# Patient Record
Sex: MALE | Race: WHITE | URBAN - METROPOLITAN AREA
[De-identification: names, ages, dates, MRNs, and addresses within clinical notes are randomized per-mention and may not be internally consistent; named-entity substitution may affect disease eponyms.]

---

## 2024-11-22 ENCOUNTER — OFFICE VISIT (OUTPATIENT)
Age: 79
End: 2024-11-22

## 2024-11-22 VITALS
OXYGEN SATURATION: 98 % | TEMPERATURE: 97.3 F | HEART RATE: 77 BPM | SYSTOLIC BLOOD PRESSURE: 118 MMHG | DIASTOLIC BLOOD PRESSURE: 74 MMHG

## 2024-11-22 DIAGNOSIS — H61.23 BILATERAL IMPACTED CERUMEN: Primary | ICD-10-CM

## 2024-11-22 RX ORDER — APIXABAN 5 MG/1
5 TABLET, FILM COATED ORAL 2 TIMES DAILY
COMMUNITY

## 2024-11-22 RX ORDER — METOPROLOL SUCCINATE 25 MG/1
25 TABLET, EXTENDED RELEASE ORAL DAILY
COMMUNITY

## 2024-11-22 RX ORDER — GLIMEPIRIDE 4 MG/1
4 TABLET ORAL
COMMUNITY

## 2024-11-22 RX ORDER — FLUOROURACIL 50 MG/G
CREAM TOPICAL
COMMUNITY
Start: 2024-10-29

## 2024-11-22 RX ORDER — IRBESARTAN 300 MG/1
300 TABLET ORAL NIGHTLY
COMMUNITY
Start: 2024-11-19

## 2024-11-22 RX ORDER — HYDROCHLOROTHIAZIDE 25 MG/1
25 TABLET ORAL DAILY
COMMUNITY

## 2024-11-22 RX ORDER — ROSUVASTATIN CALCIUM 20 MG/1
20 TABLET, COATED ORAL EVERY EVENING
COMMUNITY
Start: 2024-11-19

## 2024-11-22 NOTE — PROGRESS NOTES
Vern Galvez (:  1945) is a 79 y.o. male,New patient, here for evaluation of the following chief complaint(s):  Ear Pain (Pt states his PCP told him Wednesday that his left ear is very clogged and that right ear does have wax in it as well that needs removed)      ASSESSMENT/PLAN:    ICD-10-CM    1. Bilateral impacted cerumen  H61.23 REMOVAL IMPACTED CERUMEN IRRIGATION/LVG UNILAT        Discussed risks of irrigation (pain, infection, dizziness, TM trauma/perforatio  Questions answered.This procedure has been fully reviewed with the patient and written informed consent has been obtained. Given to flush out ears    Post irrigation, he feels better, no pain, hearing improved     Both ears:  canals clear/normal TM    Keep ears dry for 3 days, return if having any pain, drainage or any new concerns     SUBJECTIVE/OBJECTIVE:   During his routine check up with PCP this week, was told both ears blocked with wax.    Not having any pain or dizziness ,   mild decreased hearing noted        History provided by:  Patient   used: No    Ear Pain   Pertinent negatives include no coughing, ear discharge, headaches or sore throat.       Vitals:    24 1402   BP: 122/64   Pulse: 77   Temp: 97.3 °F (36.3 °C)   TempSrc: Infrared   SpO2: 98%       Review of Systems   HENT:  Positive for ear pain. Negative for ear discharge, sinus pressure and sore throat.    Respiratory:  Negative for cough.    Neurological:  Negative for dizziness and headaches.       Physical Exam    Physical  Vitals signs: reviewed  Constitutional:  appearance: well nourished .. ..no distress   Eyes:                 Pupil: equal-round-reactive to light, no photophobia, EOMI            Cornea: clear            Sclera: clear, non injected, non icteric    Ears: Right canal -wax impaction/ TM not visible           Left ear canal-wax impaction / TM not visible  Nose/Sinus:  no nasal congestion/no drainage   Mouth:                 Mucous

## 2024-11-23 ASSESSMENT — ENCOUNTER SYMPTOMS
SINUS PRESSURE: 0
COUGH: 0
SORE THROAT: 0